# Patient Record
Sex: MALE | Race: WHITE | NOT HISPANIC OR LATINO | URBAN - NONMETROPOLITAN AREA
[De-identification: names, ages, dates, MRNs, and addresses within clinical notes are randomized per-mention and may not be internally consistent; named-entity substitution may affect disease eponyms.]

---

## 2019-07-09 ENCOUNTER — APPOINTMENT (RX ONLY)
Dept: URBAN - NONMETROPOLITAN AREA CLINIC 26 | Facility: CLINIC | Age: 54
Setting detail: DERMATOLOGY
End: 2019-07-09

## 2019-07-09 DIAGNOSIS — B35.1 TINEA UNGUIUM: ICD-10-CM

## 2019-07-09 DIAGNOSIS — L60.1 ONYCHOLYSIS: ICD-10-CM

## 2019-07-09 DIAGNOSIS — L60.8 OTHER NAIL DISORDERS: ICD-10-CM

## 2019-07-09 PROBLEM — M12.9 ARTHROPATHY, UNSPECIFIED: Status: ACTIVE | Noted: 2019-07-09

## 2019-07-09 PROCEDURE — 99213 OFFICE O/P EST LOW 20 MIN: CPT

## 2019-07-09 PROCEDURE — ? COUNSELING

## 2019-07-09 NOTE — PROCEDURE: COUNSELING
Patient Specific Counseling (Will Not Stick From Patient To Patient): PT WITH ABNL NAIL CHANGES. MY SUSPICION IS THAT THIS IS MOST LIKELY SIMPLY ONYCHOLYSIS UN KNOWN ETIOLOGY BUT GIVEN THE DISTAL YELLOWING OF THE NAIL, A NAIL FUNGAL CULTURE WAS TAKEN.  PT STATES THAT LABS WERE TAKEN BY DR KAUR BUT PT WAS ABLE TO LOG ONTO HER PATEINT PORTAL FROM THE [facility] AND WE WERE NOT ABLE TO SEE THAT AN GRICELDA WAS DONE. FOR THIS REASON, ANOTHER GRICELDA WAS ORDERED.\\n\\nAFTER THE APPT WE DID RECEIVE A REPORT FROM DR KAUR THAT DEMONSTRATED A NEGATIVE GRICELDA.
Patient Specific Counseling (Will Not Stick From Patient To Patient): PT WITH COMPLAINTS OF DISTAL FINGER TIP JOINT PAIN BUT ON EXAM THERE IS NO ERYTHEMA OR SWELLING. PROXIMAL NAIL FOLD ALSO FAILED TO DEMONSTRATE CAPILLARY LOOPS.
Detail Level: Detailed